# Patient Record
(demographics unavailable — no encounter records)

---

## 2017-03-25 NOTE — EKG
Test Reason : 

Blood Pressure : ***/*** mmHG

Vent. Rate : 073 BPM     Atrial Rate : 073 BPM

   P-R Int : 152 ms          QRS Dur : 082 ms

    QT Int : 374 ms       P-R-T Axes : 051 003 023 degrees

   QTc Int : 412 ms

 

NORMAL SINUS RHYTHM

MINIMAL VOLTAGE CRITERIA FOR LVH, MAY BE NORMAL VARIANT

NONSPECIFIC ST ABNORMALITY

ABNORMAL ECG

WHEN COMPARED WITH ECG OF 01-DEC-2016 09:37,

NO SIGNIFICANT CHANGE WAS FOUND

Confirmed by MCKENNA BERNARDO MD ([id number]) on 3/25/2017 6:20:15 PM

 

Referred By: AMANDA            Confirmed By:MCKENNA BERNARDO MD

## 2017-03-25 NOTE — PDOC
History of Present Illness





- General


History Source: Patient


Exam Limitations: No Limitations





- History of Present Illness


Initial Comments: 


03/25/17 14:45


The patient is a 38 year old male, with no significant past medical history, 

who presents to the emergency department with generalized weakness and 

dizziness for the past 3 days. He reports that when he stands up he feels 

dizzy. He denies any loss of consciousness. He notes that he has been eating 

well. He denies a family history of diabetes. He reports any recent travel or 

sick contacts. 





The patient denies chest pain, shortness of breath and headache. Denies fever, 

chills, nausea, vomit, diarrhea and constipation. Denies dysuria, frequency, 

urgency and hematuria. 





Allergies: None 


Past surgical history: None reported 


Social history: No alcohol, tobacco or drug use reported 








<Edmundo Toussaint - Last Filed: 03/25/17 15:23>





<Ade Kirkpatrick - Last Filed: 03/26/17 13:18>





- General


Chief Complaint: Syncope/Near Syncope


Stated Complaint: WEAKNESS


Time Seen by Provider: 03/25/17 14:10





Past History





<Edmundo Toussaint - Last Filed: 03/25/17 15:23>





- Past Medical History


Suicide Attempt (Hx): No


Other medical history: DENIES





- Immunization History


Immunization Up to Date: Yes





- Psycho/Social/Smoking Cessation Hx


Anxiety: No


Suicidal Ideation: No


Smoking History: Never smoked


Hx Alcohol Use: No


Drug/Substance Use Hx: No


Substance Use Type: None





<Aed Kirkpatrick - Last Filed: 03/26/17 13:18>





- Past Medical History


Allergies/Adverse Reactions: 


 Allergies











Allergy/AdvReac Type Severity Reaction Status Date / Time


 


No Known Allergies Allergy   Verified 03/25/17 13:50











Home Medications: 


Ambulatory Orders





NK [No Known Home Medication]  05/01/16 











**Review of Systems





- Review of Systems


Able to Perform ROS?: Yes


Comments:: 





03/25/17 14:45


GENERAL/CONSTITUTIONAL: +Generalized weakness. No fever or chills. 


HEAD, EYES, EARS, NOSE AND THROAT: No change in vision. No ear pain or 

discharge. No sore throat.


CARDIOVASCULAR: No chest pain or shortness of breath


RESPIRATORY: No cough, wheezing, or hemoptysis.


GASTROINTESTINAL: No nausea, vomiting, diarrhea or constipation.


GENITOURINARY: No dysuria, frequency, or change in urination.


MUSCULOSKELETAL: No joint or muscle swelling or pain. No neck or back pain.


SKIN: No rash


NEUROLOGIC: +Dizziness. No headache, loss of consciousness, or change in 

strength/sensation.


ENDOCRINE: No increased thirst. No abnormal weight change


HEMATOLOGIC/LYMPHATIC: No anemia, easy bleeding, or history of blood clots.


ALLERGIC/IMMUNOLOGIC: No hives or skin allergy.





<Leonidesluz elenaEdmundomay Henson - Last Filed: 03/25/17 15:23>





*Physical Exam





- Vital Signs


 Last Vital Signs











Temp Pulse Resp BP Pulse Ox


 


 98.7 F   77   20   133/90   97 


 


 03/25/17 13:47  03/25/17 13:47  03/25/17 13:47  03/25/17 13:47  03/25/17 13:47














- Physical Exam


Comments: 





03/25/17 14:47





GENERAL: Awake, alert, and fully oriented, in no acute distress


HEAD: No signs of trauma, normocephalic, atraumatic 


EYES: PERRLA, EOMI, sclera anicteric, conjunctiva clear


ENT: Auricles normal inspection, hearing grossly normal, nares patent, 

oropharynx clear without


exudates. +Dry mucosa


NECK: Normal ROM, supple, no lymphadenopathy, JVD, or masses


LUNGS: No distress, speaks full sentences, clear to auscultation bilaterally 


HEART: Regular rate and rhythm, normal S1 and S2, no murmurs, rubs or gallops, 

peripheral pulses normal and equal bilaterally. 


ABDOMEN: Soft, nontender, normoactive bowel sounds.  No guarding, no rebound.  

No masses


EXTREMITIES: Normal inspection, Normal range of motion, no edema.  No clubbing 

or cyanosis. 


NEUROLOGICAL: Cranial nerves II through XII grossly intact.  Normal speech, 

normal gait, no focal sensorimotor deficits 


SKIN: Warm, Dry, normal turgor, no rashes or lesions noted. 








<Leonidesluz elenaEdmundomay Henson - Last Filed: 03/25/17 15:23>





- Vital Signs


 Last Vital Signs











Temp Pulse Resp BP Pulse Ox


 


 98.7 F   77   20   133/90   97 


 


 03/25/17 13:47  03/25/17 13:47  03/25/17 13:47  03/25/17 13:47  03/25/17 13:47














<Ade Kirkpatrick - Last Filed: 03/26/17 13:18>





ED Treatment Course





- LABORATORY


CBC & Chemistry Diagram: 


 03/25/17 15:00





 03/25/17 15:00





<Edmundo Toussaint - Last Filed: 03/25/17 15:23>





- LABORATORY


CBC & Chemistry Diagram: 


 03/25/17 15:00





 03/25/17 15:00





<Ade Kirkpatrick - Last Filed: 03/26/17 13:18>





Medical Decision Making





- Medical Decision Making


Labs with no significant findings. Patient improved with IV fluids. Well-

appearing, no significant findings on exam. Stable for DC home. 








<Ade Kirkpatrick - Last Filed: 03/26/17 13:18>





*DC/Admit/Observation/Transfer





- Attestations


Scribe Attestion: 





03/25/17 14:47


Documentation prepared by Edmundo Toussaint, acting as medical scribe for 

Ade Kirkpatrick MD





<Edmundo Toussaint - Last Filed: 03/25/17 15:23>





- Discharge Dispostion


Admit: No





<Ade Kirkpatrick - Last Filed: 03/26/17 13:18>


Diagnosis at time of Disposition: 


 Dehydration





- Discharge Dispostion


Disposition: HOME


Condition at time of disposition: Stable





- Referrals


Referrals: 


Caridad Portillo [Primary Care Provider] - 





- Patient Instructions


Printed Discharge Instructions:  DI for Dehydration -- Adult